# Patient Record
Sex: MALE | Race: WHITE
[De-identification: names, ages, dates, MRNs, and addresses within clinical notes are randomized per-mention and may not be internally consistent; named-entity substitution may affect disease eponyms.]

---

## 2019-02-12 ENCOUNTER — HOSPITAL ENCOUNTER (EMERGENCY)
Dept: HOSPITAL 56 - MW.ED | Age: 29
Discharge: HOME | End: 2019-02-12
Payer: SELF-PAY

## 2019-02-12 DIAGNOSIS — S62.346A: Primary | ICD-10-CM
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
## 2019-02-12 NOTE — EDM.PDOC
ED HPI GENERAL MEDICAL PROBLEM





- General


Chief Complaint: Upper Extremity Injury/Pain


Stated Complaint: RIGHT HAND PAIN


Time Seen by Provider: 02/12/19 16:35


Source of Information: Reports: Patient


History Limitations: Reports: No Limitations





- History of Present Illness


INITIAL COMMENTS - FREE TEXT/NARRATIVE: 


History of present illness:


[]patient punched a wall last night hurting his right hand.





Review of systems: 


As per history of present illness and below otherwise all systems reviewed and 

negative.





Past medical history: 


As per history of present illness and as reviewed below otherwise 

noncontributory.





Surgical history: 


As per history of present illness and as reviewed below otherwise 

noncontributory.





Social history: 


No reported history of drug or alcohol abuse.





Family history: 


As per history of present illness and as reviewed below otherwise 

noncontributory.





Physical exam:


General: Well developed, well nourished in NAD


HEENT: Atraumatic, normocephalic, pupils reactive, negative for conjunctival 

pallor or scleral icterus, mucous membranes moist, throat clear, neck supple, 

nontender, trachea midline.


Lungs: Clear to auscultation, breath sounds equal bilaterally, chest nontender.


Heart: S1S2, regular, negative for clicks, rubs, or JVD.


Abdomen: NABS, Soft, nondistended, nontender. Negative for masses or 

hepatosplenomegaly. Negative for costovertebral tenderness.


Pelvis: Stable nontender.


Genitourinary: Deferred.


Rectal: Deferred.


Extremities: Right swelling over the right fifth metacarpal pulses are palpable 

brisk capillary refill sensation is intact, negative for cords or calf pain. 

Neurovascular unremarkable.


Neuro: Awake, alert, oriented. Cranial nerves II through XII unremarkable. 

Cerebellum unremarkable. Motor and sensory unremarkable throughout. Exam 

nonfocal.


Skin:warm and dry





Diagnostics:


xray right hand





Therapeutics:


Splint





ED Course:


Unremarkable





Impression: 


Right Fifth metacarpal fracture-closed





Prescriptions:


Tramadol





Plan:


Ibuprofen, ice, elevate, wear splint follow-up with Dr. Mercer's available 

appointment.





Definitive disposition and diagnosis as appropriate pending reevaluation and 

review of above.


  ** Right Hand


Pain Score (Numeric/FACES): 2





- Related Data


 Allergies











Allergy/AdvReac Type Severity Reaction Status Date / Time


 


No Known Allergies Allergy   Verified 02/12/19 16:46











Home Meds: 


 Home Meds





. [No Known Home Meds]  02/12/19 [History]











Review of Systems





- Review of Systems


Review Of Systems: ROS reveals no pertinent complaints other than HPI.





ED EXAM, GENERAL





- Physical Exam


Exam: See Below (The history of present illness)





Course





- Vital Signs


Last Recorded V/S: 


 Last Vital Signs











Temp  97.8 F   02/12/19 16:47


 


Pulse  76   02/12/19 16:47


 


Resp  16   02/12/19 16:47


 


BP  122/88   02/12/19 16:47


 


Pulse Ox  98   02/12/19 16:47














- Orders/Labs/Meds


Orders: 


 Active Orders 24 hr











 Category Date Time Status


 


 Hand 2V Rt [CR] Stat Exams  02/12/19 16:36 Taken














Departure





- Departure


Time of Disposition: 17:19


Disposition: Home, Self-Care 01


Condition: Good


Clinical Impression: 


Fracture of fifth metacarpal bone


Qualifiers:


 Encounter type: initial encounter Fracture type: closed Metacarpal location: 

base Fracture alignment: nondisplaced Laterality: right Qualified Code(s): 

S62.346A - Nondisplaced fracture of base of fifth metacarpal bone, right hand, 

initial encounter for closed fracture








- Discharge Information


*PRESCRIPTION DRUG MONITORING PROGRAM REVIEWED*: No


*COPY OF PRESCRIPTION DRUG MONITORING REPORT IN PATIENT [place]: No


Referrals: 


PCP,None [Primary Care Provider] - 


Forms:  ED Department Discharge


Additional Instructions: 


The following information is given to patients seen in the emergency department 

who are being discharged to home. This information is to outline your options 

for follow-up care. We provide all patients seen in our emergency department 

with a follow-up referral.





The need for follow-up, as well as the timing and circumstances, are variable 

depending upon the specifics of your emergency department visit.





If you don't have a primary care physician on staff, we will provide you with a 

referral. We always advise you to contact your personal physician following an 

emergency department visit to inform them of the circumstance of the visit and 

for follow-up with them and/or the need for any referrals to a consulting 

specialist.





The emergency department will also refer you to a specialist when appropriate. 

This referral assures that you have the opportunity for follow-up care with a 

specialist. All of these measure are taken in an effort to provide you with 

optimal care, which includes your follow-up.





Under all circumstances we always encourage you to contact your private 

physician who remains a resource for coordinating your care. When calling for 

follow-up care, please make the office aware that this follow-up is from your 

recent emergency room visit. If for any reason you are refused follow-up, 

please contact the [facility] Emergency 

Department at [phone] and asked to speak to the emergency department 

charge nurse.





profen, ice, elevate, wear splint follow-up with Dr. Mercer's available 

appointment.





[facility]


Specialty Care - Plastic Surgery


[facility]


[address] [place] [address], [address] 


[place] [address]


Phone: [phone]


Fax: [phone]





- My Orders


Last 24 Hours: 


My Active Orders





02/12/19 16:36


Hand 2V Rt [CR] Stat 














- Assessment/Plan


Last 24 Hours: 


My Active Orders





02/12/19 16:36


Hand 2V Rt [CR] Stat

## 2019-02-12 NOTE — CR
INDICATION:



Trauma  



TECHNIQUE:



Two views right hand



COMPARISON:



None 



FINDINGS:



Bones: Displaced non intra-articular fracture involving the base of the 5th 

metacarpal bone. 



Joint spaces: Unremarkable.  



Soft tissues: Soft tissue edema adjacent to the base of the 5th metacarpal 

bone. 



IMPRESSION:



Displaced non intra-articular fracture involving the base of the 5th 

metacarpal bone with adjacent soft tissue edema.



Dictated by Scott Jacob MD @ 2/12/2019 5:51:50 PM



Dictated by: Scott Jacob MD @ 02/12/2019 17:51:55



(Electronically Signed)

## 2025-06-29 ENCOUNTER — HOSPITAL ENCOUNTER (EMERGENCY)
Dept: HOSPITAL 56 - MW.ED | Age: 35
Discharge: HOME | End: 2025-06-29
Payer: SELF-PAY

## 2025-06-29 DIAGNOSIS — S06.9XAA: Primary | ICD-10-CM

## 2025-06-29 DIAGNOSIS — S09.92XA: ICD-10-CM
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
